# Patient Record
Sex: MALE
[De-identification: names, ages, dates, MRNs, and addresses within clinical notes are randomized per-mention and may not be internally consistent; named-entity substitution may affect disease eponyms.]

---

## 2018-04-20 ENCOUNTER — HOSPITAL ENCOUNTER (EMERGENCY)
Dept: HOSPITAL 42 - ED | Age: 53
Discharge: HOME | End: 2018-04-20
Payer: MEDICARE

## 2018-04-20 VITALS — DIASTOLIC BLOOD PRESSURE: 70 MMHG | SYSTOLIC BLOOD PRESSURE: 169 MMHG | HEART RATE: 72 BPM

## 2018-04-20 VITALS — TEMPERATURE: 97.6 F | OXYGEN SATURATION: 98 %

## 2018-04-20 VITALS — RESPIRATION RATE: 18 BRPM

## 2018-04-20 VITALS — BODY MASS INDEX: 25.7 KG/M2

## 2018-04-20 DIAGNOSIS — E11.9: ICD-10-CM

## 2018-04-20 DIAGNOSIS — I10: ICD-10-CM

## 2018-04-20 DIAGNOSIS — Z23: ICD-10-CM

## 2018-04-20 DIAGNOSIS — I25.10: ICD-10-CM

## 2018-04-20 DIAGNOSIS — Z76.0: Primary | ICD-10-CM

## 2018-04-20 DIAGNOSIS — Z87.891: ICD-10-CM

## 2018-04-20 DIAGNOSIS — L03.319: ICD-10-CM

## 2018-04-20 LAB
APTT BLD: 26.8 SECONDS (ref 25.1–36.5)
BASE EXCESS BLDV CALC-SCNC: 0.8 MMOL/L (ref 0–2)
BASOPHILS # BLD AUTO: 0.02 K/MM3 (ref 0–2)
BASOPHILS NFR BLD: 0.2 % (ref 0–3)
BUN SERPL-MCNC: 22 MG/DL (ref 7–21)
CALCIUM SERPL-MCNC: 9.7 MG/DL (ref 8.4–10.5)
EOSINOPHIL # BLD: 0.2 10*3/UL (ref 0–0.7)
EOSINOPHIL NFR BLD: 2 % (ref 1.5–5)
ERYTHROCYTE [DISTWIDTH] IN BLOOD BY AUTOMATED COUNT: 13.5 % (ref 11.5–14.5)
GFR NON-AFRICAN AMERICAN: > 60
GRANULOCYTES # BLD: 6.86 10*3/UL (ref 1.4–6.5)
GRANULOCYTES NFR BLD: 71.8 % (ref 50–68)
HGB BLD-MCNC: 14.8 G/DL (ref 14–18)
INR PPP: 0.99 (ref 0.93–1.08)
LYMPHOCYTES # BLD: 1.9 10*3/UL (ref 1.2–3.4)
LYMPHOCYTES NFR BLD AUTO: 20 % (ref 22–35)
MCH RBC QN AUTO: 27.9 PG (ref 25–35)
MCHC RBC AUTO-ENTMCNC: 35.2 G/DL (ref 31–37)
MCV RBC AUTO: 79.4 FL (ref 80–105)
MONOCYTES # BLD AUTO: 0.6 10*3/UL (ref 0.1–0.6)
MONOCYTES NFR BLD: 6 % (ref 1–6)
PH BLDV: 7.31 [PH] (ref 7.32–7.43)
PLATELET # BLD: 140 10^3/UL (ref 120–450)
PMV BLD AUTO: 9.5 FL (ref 7–11)
PROTHROMBIN TIME: 11.4 SECONDS (ref 9.4–12.5)
RBC # BLD AUTO: 5.3 10^6/UL (ref 3.5–6.1)
VENOUS BLOOD FIO2: 21 %
VENOUS BLOOD GAS PCO2: 56 (ref 40–60)
VENOUS BLOOD GAS PO2: 31 MM/HG (ref 30–55)
WBC # BLD AUTO: 9.6 10^3/UL (ref 4.5–11)

## 2018-04-20 PROCEDURE — 90715 TDAP VACCINE 7 YRS/> IM: CPT

## 2018-04-20 PROCEDURE — 85610 PROTHROMBIN TIME: CPT

## 2018-04-20 PROCEDURE — 80048 BASIC METABOLIC PNL TOTAL CA: CPT

## 2018-04-20 PROCEDURE — 82948 REAGENT STRIP/BLOOD GLUCOSE: CPT

## 2018-04-20 PROCEDURE — 82803 BLOOD GASES ANY COMBINATION: CPT

## 2018-04-20 PROCEDURE — 90471 IMMUNIZATION ADMIN: CPT

## 2018-04-20 PROCEDURE — 96365 THER/PROPH/DIAG IV INF INIT: CPT

## 2018-04-20 PROCEDURE — 99283 EMERGENCY DEPT VISIT LOW MDM: CPT

## 2018-04-20 PROCEDURE — 96375 TX/PRO/DX INJ NEW DRUG ADDON: CPT

## 2018-04-20 PROCEDURE — 85730 THROMBOPLASTIN TIME PARTIAL: CPT

## 2018-04-20 PROCEDURE — 87181 SC STD AGAR DILUTION PER AGT: CPT

## 2018-04-20 PROCEDURE — 87070 CULTURE OTHR SPECIMN AEROBIC: CPT

## 2018-04-20 PROCEDURE — 85025 COMPLETE CBC W/AUTO DIFF WBC: CPT

## 2018-04-20 NOTE — ED PDOC
Arrival/HPI





- General


Historian: Patient





- History of Present Illness


Time/Duration: < week


Symptom Onset: Gradual


Symptom Course: Worsening





<Romulo Blackmemoemily - Last Filed: 04/20/18 13:02>





- History of Present Illness


Quality: Tightness, Stabbing, Burning


Severity Level: 8, Severe


Activities at Onset: Rest


Context: Home





<Dante Triplett - Last Filed: 04/20/18 20:08>





- General


Chief Complaint: Abnormal Skin Integrity


Time Seen by Provider: 04/20/18 10:00





- History of Present Illness


Narrative History of Present Illness (Text): 





04/20/18 08:36


53 yo M with PMH of cardiomyopathy, mitral valve prolapse, CAD s/p stent, 

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 

HTN, DM, and GERD presents to ER complaining of pelvic skin abscess. Three days 

ago, patient had a "pimple" in his pelvic region, overlying the pubic bone, 

which he popped and cleaned. The following day, the "pimple" returned, and he 

again popped and cleaned it, expressed pus and blood. Over the past two days, 

he has had increasing pain and swelling, without drainage or bleeding. He 

denies any other ulceration, skin lesions, penile ulcers, urethral discharge, 

rash, recent illness, fever, chills, nausea, vomiting, diarrhea, constipation, 

abdominal pain, groin pain, leg pain, back pain, chest pain, shortness of breath

, dysuria, hematuria. Patient is sexually active with his wife of many years, 

to whom he is faithful to and does not suspect infidelity. Patient is in 

[facility] from [place] for the birth of his [facility], and he plans 

on staying for several months.


 (Gena Black)





Past Medical History





- Provider Review


Nursing Documentation Reviewed: Yes





- Travel History


Have you recently traveled outside [place] w/in the past 3 mons?: No





- Infectious Disease


Hx of Infectious Diseases: None





- Tetanus Immunization


Tetanus Immunization: >10 years Ago





- Cardiac


Hx Cardiac Disorders: Yes


Hx Angina: Yes


Hx Cardiac Arrhythmia: Yes


Hx Mitral Valve Prolapse: Yes


Other/Comment: 4 MI, cardiomyopathy





- Pulmonary


Hx Respiratory Disorders: Yes


Hx Asthma: Yes





- Neurological


Hx Neurological Disorder: Yes


Hx Seizures: Yes


Other/Comment: Brain tumor





- HEENT


Hx HEENT Disorder: No





- Renal


Hx Renal Disorder: No





- Endocrine/Metabolic


Hx Endocrine Disorders: Yes


Hx Diabetes Mellitus Type 2: Yes


Other/Comment: BRAIN TUMOR (NON MALIGNANT)





- Hematological/Oncological


Hx Blood Disorders: No





- Integumentary


Hx Dermatological Disorder: No





- Musculoskeletal/Rheumatological


Hx Musculoskeletal Disorders: No


Hx Falls: No





- Gastrointestinal


Hx Gastrointestinal Disorders: No





- Genitourinary/Gynecological


Hx Genitourinary Disorders: No





- Psychiatric


Hx Psychophysiologic Disorder: No


Hx Substance Use: No





- Surgical History


Hx Appendectomy: Yes


Hx Orthopedic Surgery: Yes (LEFT SHOULDER REPAIR)





- Anesthesia


Hx Anesthesia: No





- Suicidal Assessment


Feels Threatened In Home Enviroment: No





<Gena Black - Last Filed: 04/20/18 13:02>





- Provider Review


Nursing Documentation Reviewed: Yes





- Past History


Past History: No Previous





<Dante Triplett - Last Filed: 04/20/18 20:08>





- Patient History


Narrative Patient History: 





cardiomyopathy, mitral valve prolapse, CAD s/p stent, carotid stenosis s/p stent

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





Family/Social History





- Physician Review


Nursing Documentation Reviewed: Yes


Family/Social History: Unknown Family HX


Smoking Status: Former Smoker


Hx Alcohol Use: No


Hx Substance Use: No





<Gena Black - Last Filed: 04/20/18 13:02>





- Physician Review


Nursing Documentation Reviewed: Yes


Hx Substance Use Treatment: No





<Dante Triplett - Last Filed: 04/20/18 20:08>





Allergies/Home Meds





<Gena Black - Last Filed: 04/20/18 13:02>





<Dante Triplett - Last Filed: 04/20/18 20:08>


Allergies/Adverse Reactions: 


Allergies





alprazolam [From Xanax] Allergy (Verified 04/20/18 08:26)


 RASH


amoxicillin Allergy (Verified 04/20/18 08:26)


 RASH


aspirin Allergy (Verified 04/20/18 08:26)


 RASH


propranolol [From Inderal LA] Allergy (Verified 04/20/18 08:26)


 RASH


shellfish derived Allergy (Verified 04/20/18 08:26)


 RASH








Home Medications: 


 Home Meds











 Medication  Instructions  Recorded  Confirmed


 


Metoprolol Succinate [Toprol XL] 25 mg PO DAILY 08/08/15 04/20/18


 


Diclofenac Sodium [Voltaren] 75 mg PO DAILY 04/20/18 04/20/18


 


Lisinopril [Zestril] 2.5 mg PO DAILY 04/20/18 04/20/18


 


Omeprazole 40 mg PO DAILY 04/20/18 04/20/18














Review of Systems





- Review of Systems


Constitutional: Normal


Eyes: Normal


ENT: Normal


Respiratory: Normal


Cardiovascular: Normal


Gastrointestinal: Normal


Genitourinary Male: Normal


Musculoskeletal: Normal


Skin: Skin Lesions (pelvic, as in HPI)


Neurological: Normal


Endocrine: Normal


Hemo/Lymphatic: Normal


Psychiatric: Normal





<Amine,Mukarram - Last Filed: 04/20/18 13:02>





Physical Exam


Vital Signs Reviewed: Yes


Temperature: Afebrile


Blood Pressure: Hypertensive


Pulse: Regular


Respiratory Rate: Normal


Appearance: Positive for: Well-Appearing, Non-Toxic, Comfortable


Pain Distress: None


Mental Status: Positive for: Alert and Oriented X 3





- Systems Exam


Head: Present: Atraumatic, Normocephalic


Pupils: Present: PERRL


Extroacular Muscles: Present: EOMI


Conjunctiva: Present: Normal


Mouth: Present: Moist Mucous Membranes


Neck: Present: Normal Range of Motion


Respiratory/Chest: Present: Clear to Auscultation, Good Air Exchange.  No: 

Respiratory Distress, Accessory Muscle Use


Cardiovascular: Present: Regular Rate and Rhythm, Normal S1, S2


Abdomen: Present: Normal Bowel Sounds.  No: Tenderness, Distention, Peritoneal 

Signs


Genitourinary Male: Present: Normal External Genitalia, Circumcised Penis, 

Lesions (5x6cm erythema with 4cm induration with 1cm dry eschar in midline, 

over pubis; tender; no fluctuance; can express scant pruluent/bloody drainage), 

Other (No inguinal lymphadenopathy).  No: Penile Discharge, Testicle Tenderness

, Penile Swelling, Masses, Testicle Swelling


Upper Extremity: Present: Normal Inspection.  No: Cyanosis, Edema


Lower Extremity: Present: Normal Inspection.  No: Edema, CALF TENDERNESS


Neurological: Present: GCS=15, CN II-XII Intact, Speech Normal


Skin: Present: Warm, Dry, Normal Color


Lymphatic: No: Inguinal Adenopathy


Psychiatric: Present: Alert, Oriented x 3, Normal Insight, Normal Concentration





<Amine,Mukarram - Last Filed: 04/20/18 13:02>


Vital Signs











  Temp Pulse Resp BP Pulse Ox


 


 04/20/18 11:37   72   169/70 H 


 


 04/20/18 11:30   78  18  169/70 H  98


 


 04/20/18 08:29  97.6 F  91 H  17  167/97 H  98














Medical Decision Making





<Gena Black - Last Filed: 04/20/18 13:02>


Re-evaluation Time: 12:00


Reassessment Condition: Improved





- Lab Interpretations


I have reviewed the lab results: Yes


Interpretation: Abnormal lab values (elevated gluc)





<Dante Triplett - Last Filed: 04/20/18 20:08>


ED Course and Treatment: 





04/20/18 10:21


Impression


Skin infection





Differential diagnosis includes, but is not limited to


Abscess, cellulitis





Plan


-- Labs


-- IV clindamycin x1


-- Analgesia


-- Tdap booster


-- Wound culture


-- Reassess and dispo





Progress note


04/20/18 12:32


-- Patient comfortable. Gave one time dose of home medications. BS elevated on 

presentation, slightly improved after home dose of glipizide, likely reactive 2/

2 infection


-- Erythema minimally improved after IV dose of clindamycin


-- Patient remains afebrile, VSS


-- Patient will be discharged with 10 days course of PO clindamycin. Instructed 

patient to return to ER for any new or worsening concerns, especially worsening 

erythema, fever/chills, increasing pain. Instructed patient to follow up with 

his PMD (Dr. Moscoso) within 3-5 days. Provided 7 day prescription for patient's 

home medications, Depakote and Glipizide, because he forgot them in [place]. 

Patient to follow up with PMD for refill. Patient verbalized understanding and 

agreement with plan. (Gena Black)





In agreement with resident note, which includes further HPI details. Patient 

was seen and evaluated with resident, came up with plan and treatment together. 


I performed the hx and physical exam of the patient and discussed their mgt 

with the RESIDENT. I reviewed the RESIDENT's NOTE and agree with the assessment 

and plan of care.





skin: noted suprapubic mound region skin erythema with central induration/non-

fluctuant, slightly tender, no blister/vesicles/lesions/ulcerations/bullae/

NIKOSKY's sign; no bloody discharge, no expressible pus discharge; does not 

extend to the penis/base of the penis; no extension to b/l groin, no extension 

posterior to gluteal folds





pt is doing well


pt is comfortable, NON-toxic appearing with stable vital signs and unremarkable 

labs





pt is made aware of his medical results


pt is recommend clean/wound care 


pt is encouraged not to pop/squeeze anything in the future


pt is directed to see his PCP next week


pt is encouraged to return to ED if worsening skin discoloration/pain/high fever

/worsening wound


pt expressed understanding


pt will be discharged home





 (Dante Triplett)





- Lab Interpretations


Lab Results: 








 04/20/18 09:00 





 04/20/18 09:00 





 Lab Results





04/20/18 12:03: POC Glucose (mg/dL) 286 H


04/20/18 10:00: PT 11.4, INR 0.99, APTT 26.8


04/20/18 09:00: Sodium 143, Chloride 101, Potassium 4.4, Carbon Dioxide 27, 

Anion Gap 19, BUN 22 H, Creatinine 1.1, Est GFR (African Amer) > 60, Est GFR (

Non-Af Amer) > 60, Random Glucose 309 H*, Calcium 9.7


04/20/18 09:00: pO2 31, VBG pH 7.31 L, VBG pCO2 56.0, VBG HCO3 28.2 H, VBG 

Total CO2 29.9 H, VBG O2 Sat (Calc) 68.2 H, VBG Base Excess 0.8, VBG Potassium 

4.2, Sodium 137.0, Chloride 102.0, Glucose 318 H, Lactate 2.0, FiO2 21.0, 

Venous Blood Potassium 4.2


04/20/18 09:00: WBC 9.6  D, RBC 5.30, Hgb 14.8, Hct 42.1, MCV 79.4 L, MCH 27.9, 

MCHC 35.2, RDW 13.5, Plt Count 140, MPV 9.5, Gran % 71.8 H, Lymph % (Auto) 20.0 

L, Mono % (Auto) 6.0, Eos % (Auto) 2.0, Baso % (Auto) 0.2, Gran # 6.86 H, Lymph 

# (Auto) 1.9, Mono # (Auto) 0.6, Eos # (Auto) 0.2, Baso # (Auto) 0.02











- Medication Orders


Current Medication Orders: 











Discontinued Medications





Divalproex Sodium (Depakote Dr (*Bid*))  500 mg PO STAT STA


   PRN Reason: Protocol


   Stop: 04/20/18 10:32


   Last Admin: 04/20/18 11:38  Dose: 500 mg





Fentanyl (Fentanyl)  50 mcg IVP ONCE ONE


   Stop: 04/20/18 08:57


   Last Admin: 04/20/18 09:17  Dose: 50 mcg





MAR Pain Assessment


 Document     04/20/18 09:17  [facility]  (Rec: 04/20/18 09:17  [id number]


     Pain Reassessment


      Is this a pain reassessment?               No


     Sleep


      Is patient sleeping during reassessment?   No


     Presence of Pain


      Presence of Pain                           Yes


     Pain Scale Used


      Pain Scale Used                            Numeric


     Location


      Upper or Lower                             Lower


      Pain Location Body Site                    Abdomen


     Description


      Description                                Constant


      Intensity of Pain at present               8


IVP Administration


 Document     04/20/18 09:17  [facility]  (Rec: 04/20/18 09:17  [id number]


     Charges for Administration


      # of IVP Administrations                   1





Glipizide (Glucotrol)  5 mg PO ACB LUANNE


   Last Admin: 04/20/18 11:38  Dose: 5 mg





Clindamycin Phosphate 600 mg/ (Sodium Chloride)  54 mls @ 108 mls/hr IVPB STAT 

STA


   PRN Reason: Protocol


   Stop: 04/20/18 09:55


   Last Admin: 04/20/18 09:56  Dose: 108 mls/hr





eMAR Start Stop


 Document     04/20/18 09:56  [facility]  (Rec: 04/20/18 09:56  [id number]


     Intravenous Solution


      Start Date                                 04/20/18


      Start Time                                 09:56


      End Date                                   04/20/18


      End time                                   10:26


      Total Infusion Time                        30





Lisinopril (Zestril)  2.5 mg PO STAT STA


   Stop: 04/20/18 10:32


   Last Admin: 04/20/18 11:38  Dose: 2.5 mg





Metoprolol Succinate (Toprol Xl)  25 mg PO STAT STA


   Stop: 04/20/18 10:32


   Last Admin: 04/20/18 11:37  Dose: 25 mg





MAR Pulse and Blood Pressure


 Document     04/20/18 11:37  [facility]  (Rec: 04/20/18 11:38  [id number]


     Pulse


      Pulse Rate (60-90)                         72


     Blood Pressure


      Blood Pressure (100//90)             169/70





Pantoprazole Sodium (Protonix Ec Tab)  40 mg PO STAT STA


   Stop: 04/20/18 10:32


   Last Admin: 04/20/18 11:37  Dose: 40 mg





Tetanus/Reduced Diphtheria/Acell Pertussis (Boostrix Vaccine Inj)  0.5 ml IM 

.ONCE ONE


   Stop: 04/20/18 08:57


   Last Admin: 04/20/18 09:16  Dose: 0.5 ml





Immunization Registry


 Document     04/20/18 09:16  [facility]  (Rec: 04/20/18 09:17  [facility]  [id number])


      Immunization Registry Consent Date         04/20/18














<Gena Black - Last Filed: 04/20/18 13:02>





- PA / NP / Resident Statement


MD/ has reviewed & agrees with the documentation as recorded.


MD/ has examined the patient and agrees with the treatment plan.





- Scribe Statement


The provider has reviewed the documentation as recorded by the Scribe





<Dante Triplett - Last Filed: 04/20/18 20:08>





- Scribe Statement





Bel Jensen





Provider Scribe Attestation:


All medical record entries made by the Scribe were at my direction and 

personally dictated by me. I have reviewed the chart and agree that the record 

accurately reflects my personal performance of the history, physical exam, 

medical decision making, and the department course for this patient. I have 

also personally directed, reviewed, and agree with the discharge instructions 

and disposition.  (Dante Triplett)





Disposition/Present on Arrival





- Present on Arrival


Any Indicators Present on Arrival: No


History of DVT/PE: No


History of Uncontrolled Diabetes: No


Urinary Catheter: No


History of Decub. Ulcer: No


History Surgical Site Infection Following: None





- Disposition


Have Diagnosis and Disposition been Completed?: Yes


Disposition Time: 12:36


Patient Plan: Discharge





<Gena Black - Last Filed: 04/20/18 13:02>





<Dante Triplett - Last Filed: 04/20/18 20:08>





- Disposition


Diagnosis: 


 Cellulitis, Medication refill





Disposition: HOME/ ROUTINE


Condition: FAIR


Discharge Instructions (ExitCare):  Cellulitis (Skin Infection), Adult (DC)


Additional Instructions: 


-- Continue to take the clindamycin four times daily for the next 10 days


-- Continue to take all other medications as previously prescribed


-- Follow up with Dr. Moscoso within the next 3-5 days


-- Use motrin or aleve for pain


-- Watch for worsening redness (more than 50% of current size is concerning), 

worsening pain, fever/chills, nausea/vomiting


-- Return to the ER for any new or worsening concerns, especially the above


-- Do not try to "pop" your infection, or any new lesions. Keep it clean and 

dry.


Prescriptions: 


Clindamycin [Cleocin] 150 mg PO Q6H #40 cap


Divalproex [Depakote ER] 500 mg PO DAILY #7 ter


GlipiZIDE [Glucotrol] 5 mg PO DAILY #7 tab


Referrals: 


Julio Reyes, [Primary Care Provider] - Follow up with primary


Forms:  [employer] (English)

## 2018-04-22 NOTE — ED PDOC
ED Additional Note





- Physician Additional Note


Physician Additional Note: 





spoke with patient regarding wound culture. MRSA sensitive to clindamycin. pt 

currently on clindamycin. i spoke with the patient he is feeling much better. 

states infection has improved. he has f/u with pmd tomorrow. advised return if 

symptoms worsen,persist or if new symptoms develop.

## 2018-07-13 ENCOUNTER — HOSPITAL ENCOUNTER (OUTPATIENT)
Dept: HOSPITAL 42 - ED | Age: 53
Setting detail: OBSERVATION
LOS: 1 days | Discharge: HOME | End: 2018-07-14
Attending: INTERNAL MEDICINE | Admitting: INTERNAL MEDICINE
Payer: MEDICARE

## 2018-07-13 VITALS — BODY MASS INDEX: 26.4 KG/M2

## 2018-07-13 DIAGNOSIS — Z87.891: ICD-10-CM

## 2018-07-13 DIAGNOSIS — J45.909: ICD-10-CM

## 2018-07-13 DIAGNOSIS — Z95.5: ICD-10-CM

## 2018-07-13 DIAGNOSIS — I10: ICD-10-CM

## 2018-07-13 DIAGNOSIS — I42.9: ICD-10-CM

## 2018-07-13 DIAGNOSIS — M54.12: ICD-10-CM

## 2018-07-13 DIAGNOSIS — I25.2: ICD-10-CM

## 2018-07-13 DIAGNOSIS — D32.9: ICD-10-CM

## 2018-07-13 DIAGNOSIS — I34.1: ICD-10-CM

## 2018-07-13 DIAGNOSIS — I25.10: ICD-10-CM

## 2018-07-13 DIAGNOSIS — R07.89: Primary | ICD-10-CM

## 2018-07-13 DIAGNOSIS — E78.5: ICD-10-CM

## 2018-07-13 DIAGNOSIS — Z86.73: ICD-10-CM

## 2018-07-13 DIAGNOSIS — E11.9: ICD-10-CM

## 2018-07-13 DIAGNOSIS — I48.0: ICD-10-CM

## 2018-07-13 LAB
ALBUMIN SERPL-MCNC: 4.5 G/DL (ref 3–4.8)
ALBUMIN/GLOB SERPL: 1.5 {RATIO} (ref 1.1–1.8)
ALT SERPL-CCNC: 78 U/L (ref 7–56)
AST SERPL-CCNC: 49 U/L (ref 17–59)
BASOPHILS # BLD AUTO: 0.03 K/MM3 (ref 0–2)
BASOPHILS NFR BLD: 0.6 % (ref 0–3)
BUN SERPL-MCNC: 18 MG/DL (ref 7–21)
CALCIUM SERPL-MCNC: 9.5 MG/DL (ref 8.4–10.5)
EOSINOPHIL # BLD: 0.1 10*3/UL (ref 0–0.7)
EOSINOPHIL NFR BLD: 2.2 % (ref 1.5–5)
ERYTHROCYTE [DISTWIDTH] IN BLOOD BY AUTOMATED COUNT: 13.7 % (ref 11.5–14.5)
GFR NON-AFRICAN AMERICAN: > 60
GRANULOCYTES # BLD: 3.38 10*3/UL (ref 1.4–6.5)
GRANULOCYTES NFR BLD: 62.4 % (ref 50–68)
HGB BLD-MCNC: 14.3 G/DL (ref 14–18)
LYMPHOCYTES # BLD: 1.7 10*3/UL (ref 1.2–3.4)
LYMPHOCYTES NFR BLD AUTO: 30.6 % (ref 22–35)
MCH RBC QN AUTO: 28.1 PG (ref 25–35)
MCHC RBC AUTO-ENTMCNC: 35.9 G/DL (ref 31–37)
MCV RBC AUTO: 78.2 FL (ref 80–105)
MONOCYTES # BLD AUTO: 0.2 10*3/UL (ref 0.1–0.6)
MONOCYTES NFR BLD: 4.2 % (ref 1–6)
PLATELET # BLD: 135 10^3/UL (ref 120–450)
PMV BLD AUTO: 9.6 FL (ref 7–11)
RBC # BLD AUTO: 5.09 10^6/UL (ref 3.5–6.1)
TROPONIN I SERPL-MCNC: < 0.01 NG/ML
WBC # BLD AUTO: 5.4 10^3/UL (ref 4.5–11)

## 2018-07-13 PROCEDURE — 82948 REAGENT STRIP/BLOOD GLUCOSE: CPT

## 2018-07-13 PROCEDURE — 82550 ASSAY OF CK (CPK): CPT

## 2018-07-13 PROCEDURE — 83615 LACTATE (LD) (LDH) ENZYME: CPT

## 2018-07-13 PROCEDURE — 36415 COLL VENOUS BLD VENIPUNCTURE: CPT

## 2018-07-13 PROCEDURE — 97162 PT EVAL MOD COMPLEX 30 MIN: CPT

## 2018-07-13 PROCEDURE — 93306 TTE W/DOPPLER COMPLETE: CPT

## 2018-07-13 PROCEDURE — 84443 ASSAY THYROID STIM HORMONE: CPT

## 2018-07-13 PROCEDURE — 97530 THERAPEUTIC ACTIVITIES: CPT

## 2018-07-13 PROCEDURE — 99285 EMERGENCY DEPT VISIT HI MDM: CPT

## 2018-07-13 PROCEDURE — 84484 ASSAY OF TROPONIN QUANT: CPT

## 2018-07-13 PROCEDURE — 80053 COMPREHEN METABOLIC PANEL: CPT

## 2018-07-13 PROCEDURE — 96374 THER/PROPH/DIAG INJ IV PUSH: CPT

## 2018-07-13 PROCEDURE — 93005 ELECTROCARDIOGRAM TRACING: CPT

## 2018-07-13 PROCEDURE — 80061 LIPID PANEL: CPT

## 2018-07-13 PROCEDURE — 71045 X-RAY EXAM CHEST 1 VIEW: CPT

## 2018-07-13 PROCEDURE — 85025 COMPLETE CBC W/AUTO DIFF WBC: CPT

## 2018-07-13 PROCEDURE — 83036 HEMOGLOBIN GLYCOSYLATED A1C: CPT

## 2018-07-13 PROCEDURE — 83735 ASSAY OF MAGNESIUM: CPT

## 2018-07-13 PROCEDURE — 72125 CT NECK SPINE W/O DYE: CPT

## 2018-07-13 RX ADMIN — INSULIN HUMAN SCH: 100 INJECTION, SOLUTION PARENTERAL at 21:17

## 2018-07-13 RX ADMIN — INSULIN HUMAN SCH: 100 INJECTION, SOLUTION PARENTERAL at 17:18

## 2018-07-13 NOTE — CARD
--------------- APPROVED REPORT --------------





Date of service: 07/13/2018



EKG Measurement

Heart [id number]

NC [id number]

[id number]

[id number]

[id number]



<Conclusion>

Normal sinus rhythm

Normal ECG

## 2018-07-13 NOTE — CP.PCM.HP
<LoreeIrving - Last Filed: 07/13/18 17:33>





History of Present Illness





- History of Present Illness


History of Present Illness: 


CC: Chest Pain, Neck Pain


HPI:


Mr. Downing is a 52 year old male with PMH of 5-7 heart attacks s/p 1 stent, HTN, 

DLD not on statin, DM-2, CVA at age 22 no longer on plavix, paroxysmal atrial 

fibrillation, and meningioma s/p 5 brain surgeries who presents to the ED with 

chest pain and neck pain. The chest pain started this morning while he was 

driving and he describes the pain to be sharp, 10/10, does not radiate, and he 

states that this chest pain feels different from a heart attack. The chest pain 

is located mid-sternal and is reproducible. He was given nitroglycerin in the 

ED and the pain went down to a 5/10. Patient also complains of neck pain that 

started 2 months ago. He describes the pain to be sharp and radiates to his 

entire left arm. The pain is made significantly worse when he sidebends his 

head to the left and on head extension. He also admits to having tingling and 

numbness to both his hands especially on his left thumb. Patient denies 

shortness of breath, fever, chills, abdominal pain, nausea, vomiting, diarrhea, 

and urinary symptoms








12 point ROS negative except as indicated in HPI





Past medical history: 5-7 MI's, CVA, HTN, DM-2, HLD, paroxysmal atrial 

fibrillation, and meningioma.


Surgical History: 5 brain surgeries, 1 x cardiac stent, carotid artery stent, 

rotator cuff muscle and tendon repair.


Allergies: Aspirin-rash, amoxicillin, xanax, propanolol, shellfish.


Social History: Tobacco- 31 pack years, last smoked 5 years ago. Denies alcohol

, used to be a social drinker.  Denies illicit drug use.  


Family History: Father- high cholesterol


PMD: Dr. Moscoso


Pharmacy: [facility]: Metoprolol, omeprazole, glipizide, lisinopril, diclofenac, divalproex, 

gabapentin.








Present on Admission





- Present on Admission


Any Indicators Present on Admission: Yes


History of DVT/PE: No


History of Uncontrolled Diabetes: Yes


Urinary Catheter: No


Decubitus Ulcer Present: No





Review of Systems





- Review of Systems


Review of Systems: 


12 point ROS negative except as indicated in HPI





Past Patient History





- Infectious Disease


Hx of Infectious Diseases: None





- Tetanus Immunizations


Tetanus Immunization: >10 years Ago





- Past Medical History & Family History


Past Medical History?: Yes





- Past Social History


Smoking Status: Former Smoker





- CARDIAC


Hx Cardiac Disorders: Yes


Hx Angina: Yes


Hx Cardia Arrhythmia: Yes


Hx Mitral Valve Prolapse: Yes


Other/Comment: 4 MI, cardiomyopathy





- PULMONARY


Hx Respiratory Disorders: Yes


Hx Asthma: Yes





- NEUROLOGICAL


Hx Neurological Disorder: Yes


Hx Seizures: Yes


Other/Comment: Brain tumor





- HEENT


Hx HEENT Problems: No





- RENAL


Hx Chronic Kidney Disease: No





- ENDOCRINE/METABOLIC


Hx Endocrine Disorders: Yes


Hx Diabetes Mellitus Type 2: Yes


Other/Comment: BRAIN TUMOR (NON MALIGNANT)





- HEMATOLOGICAL/ONCOLOGICAL


Hx Blood Disorders: No





- INTEGUMENTARY


Hx Dermatological Problems: No





- MUSCULOSKELETAL/RHEUMATOLOGICAL


Hx Musculoskeletal Disorders: No


Hx Falls: No





- GASTROINTESTINAL


Hx Gastrointestinal Disorders: No





- GENITOURINARY/GYNECOLOGICAL


Hx Genitourinary Disorders: No





- PSYCHIATRIC


Hx Psychophysiologic Disorder: No


Hx Substance Use: No





- SURGICAL HISTORY


Hx Appendectomy: Yes


Hx Orthopedic Surgery: Yes (LEFT SHOULDER REPAIR)





- ANESTHESIA


Hx Anesthesia: No





Meds


Allergies/Adverse Reactions: 


 Allergies











Allergy/AdvReac Type Severity Reaction Status Date / Time


 


alprazolam [From Xanax] Allergy  RASH Verified 04/20/18 08:26


 


amoxicillin Allergy  RASH Verified 04/20/18 08:26


 


aspirin Allergy  RASH Verified 04/20/18 08:26


 


propranolol [From Inderal LA] Allergy  RASH Verified 04/20/18 08:26


 


shellfish derived Allergy  RASH Verified 04/20/18 08:26














Physical Exam





- Constitutional


Appears: No Acute Distress





- Head Exam


Head Exam: ATRAUMATIC, NORMAL INSPECTION, NORMOCEPHALIC





- Eye Exam


Eye Exam: Normal appearance, PERRL.  absent: Nystagmus, Scleral icterus


Pupil Exam: NORMAL ACCOMODATION





- ENT Exam


ENT Exam: Mucous Membranes Moist





- Neck Exam


Neck exam: Positive for: Tenderness.  Negative for: Full Rom


Additional comments: 


Cervical spine tender on palpation on the back and left side.





- Respiratory Exam


Respiratory Exam: Clear to Auscultation Bilateral.  absent: Rales, Rhonchi, 

Wheezes





- Cardiovascular Exam


Cardiovascular Exam: Irregular Rhythm, +S1, +S2.  absent: Gallop, Rubs, 

Systolic Murmur





- GI/Abdominal Exam


GI & Abdominal Exam: Normal Bowel Sounds, Soft.  absent: Tenderness





- Extremities Exam


Extremities exam: Negative for: full ROM, pedal edema, tenderness


Additional comments: 


Sensations intact on bilateral arms except for loss of sensation of left thumb.





- Back Exam


Back exam: paraspinal tenderness, vertebral tenderness


Additional comments: 


Cervical spine tender on palpation and hypertonic cervical paraspinal muscles.





- Neurological Exam


Neurological exam: Alert, CN II-XII Intact, Oriented x3, Reflexes Normal


Additional comments: 


Muscle strength left extremity 3/5


Muscle strength right extremity 5/5.





- Psychiatric Exam


Psychiatric exam: Normal Affect, Normal Mood





- Skin


Skin Exam: Dry, Normal Color, Warm





Results





- Vital Signs


Recent Vital Signs: 





 Last Vital Signs











Temp  98.6 F   07/13/18 13:33


 


Pulse  62   07/13/18 13:33


 


Resp  16   07/13/18 13:33


 


BP  152/90 H  07/13/18 13:33


 


Pulse Ox  99   07/13/18 13:33














- Labs


Result Diagrams: 


 07/13/18 13:47





 07/13/18 13:47





Assessment & Plan





- Assessment and Plan (Free Text)


Assessment: 


Mr. Downing is a 52 year old male with PMH of 5-7 MI's, HTN, DLD not on statin, DM-

2, CVA at age 22 no longer on plavix, paroxysmal atrial fibrillation, and 

meningioma s/p 5 brain surgeries who presents to the ED with chest pain and 

neck pain


Plan: 


Chest pain


- Most likely muskuloskeletal but need to r/o ACS due to patient's cardiac 

history of MI's and s/p stent


- Not on antiplatelets- allergic to ASA


- Troponin 0.01 x 1, will continue to trend Q6H


- Cardiology consulted


- EKG: NSR @ 85, no ST elevations or depressions.


- CXR: No acute findings


- Echo pending


- C/w Metoprolol


- Heart healthy diet





Cervical radiculopathy


- CT scan of neck pending


- PT consulted


- Start Flexeril





History of diabetes


- HbA1c ordered


- ISS


- Fingerstick blood glucose- ACHS





History of hyperlipidemia


- Not on lipid-lowering medications


- Lipid panel ordered





History of hypertension


- C/w lisinopril


- Continue to monitor 





History of brain tumor


- C/w Divalproex





GI/DVT prophylaxis


- Lovenox


- Protonix











Patient seen, examined, and case discussed with Dr. Mckeon





<Olive Mckeon - Last Filed: 07/14/18 19:02>





Results





- Vital Signs


Recent Vital Signs: 





 Last Vital Signs











Temp  98 F   07/14/18 11:47


 


Pulse  90   07/14/18 18:00


 


Resp  20   07/14/18 11:47


 


BP  138/82   07/14/18 11:47


 


Pulse Ox  99   07/14/18 09:00














- Labs


Result Diagrams: 


 07/14/18 06:20





 07/14/18 06:20


Labs: 





 Laboratory Results - last 24 hr











  07/13/18 07/13/18 07/14/18





  19:45 20:53 01:15


 


WBC   


 


RBC   


 


Hgb   


 


Hct   


 


MCV   


 


MCH   


 


MCHC   


 


RDW   


 


Plt Count   


 


MPV   


 


Gran %   


 


Lymph % (Auto)   


 


Mono % (Auto)   


 


Eos % (Auto)   


 


Baso % (Auto)   


 


Gran #   


 


Lymph # (Auto)   


 


Mono # (Auto)   


 


Eos # (Auto)   


 


Baso # (Auto)   


 


Sodium   


 


Potassium   


 


Chloride   


 


Carbon Dioxide   


 


Anion Gap   


 


BUN   


 


Creatinine   


 


Est GFR ( Amer)   


 


Est GFR (Non-Af Amer)   


 


POC Glucose (mg/dL)   248 H 


 


Random Glucose   


 


Calcium   


 


Total Bilirubin   


 


AST   


 


ALT   


 


Alkaline Phosphatase   


 


Troponin I  < 0.01   < 0.01


 


Total Protein   


 


Albumin   


 


Globulin   


 


Albumin/Globulin Ratio   


 


Triglycerides   


 


Cholesterol   


 


LDL Cholesterol Direct   


 


HDL Cholesterol   














  07/14/18 07/14/18 07/14/18





  06:20 06:20 07:47


 


WBC  5.3  


 


RBC  4.83  


 


Hgb  13.3 L  


 


Hct  38.2 L  


 


MCV  79.1 L  


 


MCH  27.5  


 


MCHC  34.8  


 


RDW  13.6  


 


Plt Count  128  


 


MPV  9.8  


 


Gran %  46.0 L  


 


Lymph % (Auto)  44.7 H  


 


Mono % (Auto)  5.1  


 


Eos % (Auto)  3.8  


 


Baso % (Auto)  0.4  


 


Gran #  2.45  


 


Lymph # (Auto)  2.4  


 


Mono # (Auto)  0.3  


 


Eos # (Auto)  0.2  


 


Baso # (Auto)  0.02  


 


Sodium   138 


 


Potassium   4.2 


 


Chloride   100 


 


Carbon Dioxide   28 


 


Anion Gap   14 


 


BUN   25 H 


 


Creatinine   1.1 


 


Est GFR ( Amer)   > 60 


 


Est GFR (Non-Af Amer)   > 60 


 


POC Glucose (mg/dL)    192 H


 


Random Glucose   188 H 


 


Calcium   9.1 


 


Total Bilirubin   0.5 


 


AST   43 


 


ALT   72 H 


 


Alkaline Phosphatase   65 


 


Troponin I   


 


Total Protein   6.9 


 


Albumin   4.0 


 


Globulin   2.9 


 


Albumin/Globulin Ratio   1.4 


 


Triglycerides   869 H 


 


Cholesterol   210 H 


 


LDL Cholesterol Direct   50 


 


HDL Cholesterol   27 L 














  07/14/18 07/14/18





  11:30 17:16


 


WBC  


 


RBC  


 


Hgb  


 


Hct  


 


MCV  


 


MCH  


 


MCHC  


 


RDW  


 


Plt Count  


 


MPV  


 


Gran %  


 


Lymph % (Auto)  


 


Mono % (Auto)  


 


Eos % (Auto)  


 


Baso % (Auto)  


 


Gran #  


 


Lymph # (Auto)  


 


Mono # (Auto)  


 


Eos # (Auto)  


 


Baso # (Auto)  


 


Sodium  


 


Potassium  


 


Chloride  


 


Carbon Dioxide  


 


Anion Gap  


 


BUN  


 


Creatinine  


 


Est GFR ( Amer)  


 


Est GFR (Non-Af Amer)  


 


POC Glucose (mg/dL)  185 H  189 H


 


Random Glucose  


 


Calcium  


 


Total Bilirubin  


 


AST  


 


ALT  


 


Alkaline Phosphatase  


 


Troponin I  


 


Total Protein  


 


Albumin  


 


Globulin  


 


Albumin/Globulin Ratio  


 


Triglycerides  


 


Cholesterol  


 


LDL Cholesterol Direct  


 


HDL Cholesterol  














Attending/Attestation





- Attestation


I have personally seen and examined this patient.: Yes


I have fully participated in the care of the patient.: Yes


I have reviewed all pertinent clinical information: Yes


Notes (Text): 





07/14/18 18:57





Attending note;





Patient seen and examined with the resident in ER.


Patient is alert and awake.





Patient is a 52 year old male with  the PMH of  ? heart attacks s/p 1 stent, HTN

, hypercholesterolemia, DM-2, CVA at age 22, and meningioma s/p 5 brain 

surgeries who presents to the ED with chest pain and neck pain.


Patient has multiple medical issues dating from age 22.


Patient has been seeing  multiple doctors in [place] and in [place].


History per patient. No other documentation available at this time.





Patient was recently seen by PMD .





Patient has a chronic neck pain for the past few months.


Patient is currently pain-free.


Admit to telemetry.


EKG no acute changes. Cardiac enzymes negative.


Cardiology evaluation requested.


Echocardiogram ordered.


Denies any recent stress test.





CT of cervical spine ordered. Physical therapy evaluation requested.


Started on Flexeril.





Monitor closely in telemetry.





Upon discharge patient will follow-up with PMD Dr. Moscoso.

## 2018-07-13 NOTE — ED PDOC
Arrival/HPI





- General


Chief Complaint: Chest Pain


Time Seen by Provider: 07/13/18 13:41


Historian: Patient





- History of Present Illness


Narrative History of Present Illness (Text): 





07/13/18 13:44


52 year old male, whose PMH includes angina, mitral valve prolapse, x4 MI, 

cardiomyopathy, asthma, seizure, benign brain tumor and diabetes, who presents 

to the emergency department complaining of mid sternal to left sided chest pain 

associated with nausea since this morning. Patient reports driving and felt 

uncomfortable. He also complaints of neck pain that radiates to bilateral arms 

causing his right hand to go numb x 3 months. Patient notes the chest pain has 

mildly gone down to a 7 out of 10 compared to this morning but has worsening 

neck pain. Additionally, he notes being allergic to Aspirin. Patient denies 

fever, shortness of breath, abdominal pain, vomiting, diarrhea, dizziness, or 

other complaints. 





PMD: Dr. Moscoso 





Time/Duration: Prior to Arrival


Symptom Onset: Sudden


Symptom Course: Improving


Activities at Onset: Rest


Context: Home





Past Medical History





- Provider Review


Nursing Documentation Reviewed: Yes





- Past History


Past History: No Previous





- Infectious Disease


Hx of Infectious Diseases: None





- Tetanus Immunization


Tetanus Immunization: >10 years Ago





- Cardiac


Hx Cardiac Disorders: Yes


Hx Angina: Yes


Hx Cardiac Arrhythmia: Yes


Hx Mitral Valve Prolapse: Yes


Other/Comment: 4 MI, cardiomyopathy





- Pulmonary


Hx Respiratory Disorders: Yes


Hx Asthma: Yes





- Neurological


Hx Neurological Disorder: Yes


Hx Seizures: Yes


Other/Comment: Brain tumor





- HEENT


Hx HEENT Disorder: No





- Renal


Hx Renal Disorder: No





- Endocrine/Metabolic


Hx Endocrine Disorders: Yes


Hx Diabetes Mellitus Type 2: Yes


Other/Comment: BRAIN TUMOR (NON MALIGNANT)





- Hematological/Oncological


Hx Blood Disorders: No





- Integumentary


Hx Dermatological Disorder: No





- Musculoskeletal/Rheumatological


Hx Musculoskeletal Disorders: No


Hx Falls: No





- Gastrointestinal


Hx Gastrointestinal Disorders: No





- Genitourinary/Gynecological


Hx Genitourinary Disorders: No





- Psychiatric


Hx Psychophysiologic Disorder: No


Hx Substance Use: No





- Surgical History


Hx Appendectomy: Yes


Hx Orthopedic Surgery: Yes (LEFT SHOULDER REPAIR)





- Anesthesia


Hx Anesthesia: No





- Suicidal Assessment


Feels Threatened In Home Enviroment: No





Family/Social History





- Physician Review


Nursing Documentation Reviewed: Yes


Family/Social History: CAD/MI (father MI at 51 year old)


Smoking Status: Former Smoker


Hx Alcohol Use: No


Hx Substance Use: No


Hx Substance Use Treatment: No





Allergies/Home Meds


Allergies/Adverse Reactions: 


Allergies





alprazolam [From Xanax] Allergy (Verified 04/20/18 08:26)


 RASH


amoxicillin Allergy (Verified 04/20/18 08:26)


 RASH


aspirin Allergy (Verified 04/20/18 08:26)


 RASH


propranolol [From Inderal LA] Allergy (Verified 04/20/18 08:26)


 RASH


shellfish derived Allergy (Verified 04/20/18 08:26)


 RASH








Home Medications: 


 Home Meds











 Medication  Instructions  Recorded  Confirmed


 


Metoprolol Succinate XL [Toprol XL] 25 mg PO DAILY 08/08/15 04/20/18


 


Diclofenac Sodium [Voltaren] 75 mg PO DAILY 04/20/18 04/20/18


 


Lisinopril [Zestril] 2.5 mg PO DAILY 04/20/18 04/20/18


 


Omeprazole 40 mg PO DAILY 04/20/18 04/20/18














Review of Systems





- Review of Systems


Constitutional: absent: Fevers


Eyes: absent: Vision Changes


Respiratory: absent: SOB


Cardiovascular: Chest Pain


Gastrointestinal: Nausea.  absent: Abdominal Pain, Diarrhea, Vomiting


Genitourinary Male: absent: Dysuria


Musculoskeletal: Neck Pain (radiates to bilateral arms )


Neurological: absent: Headache, Dizziness


Endocrine: absent: Diaphoresis


Hemo/Lymphatic: absent: Adenopathy





Physical Exam


Vital Signs Reviewed: Yes


Vital Signs











  Temp Pulse Resp BP Pulse Ox


 


 07/13/18 13:33  98.6 F  62  16  152/90 H  99











Temperature: Afebrile


Blood Pressure: Hypertensive


Pulse: Regular


Respiratory Rate: Normal


Appearance: Positive for: Well-Appearing, Non-Toxic, Comfortable


Pain Distress: None


Mental Status: Positive for: Alert and Oriented X 3





- Systems Exam


Head: Present: Atraumatic, Normocephalic


Pupils: Present: PERRL


Extroacular Muscles: Present: EOMI


Conjunctiva: Present: Normal


Mouth: Present: Moist Mucous Membranes


Neck: Present: Paraspinal Tenderness (tenderness on left side of neck and low 

right side of neck )


Respiratory/Chest: Present: Clear to Auscultation, Good Air Exchange.  No: 

Respiratory Distress, Accessory Muscle Use, Wheezes, Decreased Breath Sounds, 

Rales, Retracting, Rhonchi


Cardiovascular: Present: Regular Rate and Rhythm, Normal S1, S2.  No: Murmurs


Abdomen: Present: Normal Bowel Sounds.  No: Tenderness, Distention, Peritoneal 

Signs, Rebound, Guarding


Upper Extremity: Present: Normal ROM, NORMAL PULSES, Tenderness (bilateral 

anterior shoulder and ulnar side of elbow tenderness ), Neurovascularly Intact, 

Capillary Refill < 2s.  No: Cyanosis, Edema, Erythema, Deformity


Neurological: Present: GCS=15, CN II-XII Intact, Speech Normal, Motor Func 

Grossly Intact, Normal Sensory Function, Normal Cerebellar Funct, Memory Normal


Skin: Present: Warm, Dry, Normal Color.  No: Rashes


Psychiatric: Present: Alert, Oriented x 3, Normal Insight, Normal Concentration





Medical Decision Making


ED Course and Treatment: 





07/13/18


Impression:


52 year old male with tenderness on left side of neck and low right side of neck

, tenderness on bilateral anterior shoulder and ulnar side of elbow complaining 

of chest pain and neck pain that radiates to arms.  








Differential Diagnosis included but are not limited to:  r/o ACS vs. Cervical 

Radioculopathy 





Plan:


-- EKG


-- Chest X-ray 


-- Labs 


-- Morphine and Nitroglycerine 


-- Reassess and disposition





Progress Notes:


 


EKG:


Ordered, reviewed, and independently interpreted the EKG.


Rate : 85 BPM


Rhythm : NSR


Interpretation : No ST-segment elevations or depressions, no T-wave inversions, 

normal intervals.





07/13/18 15:01


Chest X-ray: Edward Fobben 


Impressions: No active disease





07/13/18 15:11


Chest pain resolved after Nitro. Still has neck pain but improving. 





Case was discussed with Dr. Mckeon who agrees to place on telemetry. Dr. Kuo on call so a consult was placed for him. 





- Lab Interpretations


Lab Results: 








 07/13/18 13:47 





 07/13/18 13:47 





 Lab Results





07/13/18 13:47: Sodium 138, Potassium 4.7, Chloride 100, Carbon Dioxide 25, 

Anion Gap 18, BUN 18, Creatinine 0.9, Est GFR (African Amer) > 60, Est GFR (Non-

Af Amer) > 60, Random Glucose 279 H, Calcium 9.5, Magnesium 1.8, Total 

Bilirubin 0.7, AST 49, ALT 78 H, Alkaline Phosphatase 71, Lactate Dehydrogenase 

405, Total Creatine Kinase 188, Troponin I < 0.01, Total Protein 7.6, Albumin 

4.5, Globulin 3.1, Albumin/Globulin Ratio 1.5


07/13/18 13:47: WBC 5.4  D, RBC 5.09, Hgb 14.3, Hct 39.8 L, MCV 78.2 L, MCH 28.1

, MCHC 35.9, RDW 13.7, Plt Count 135, MPV 9.6, Gran % 62.4, Lymph % (Auto) 30.6

, Mono % (Auto) 4.2, Eos % (Auto) 2.2, Baso % (Auto) 0.6, Gran # 3.38, Lymph # (

Auto) 1.7, Mono # (Auto) 0.2, Eos # (Auto) 0.1, Baso # (Auto) 0.03








I have reviewed the lab results: Yes





- RAD Interpretation


Radiology Orders: 








07/13/18 13:42


CHEST PORTABLE [RAD] Stat 











: Radiologist





- EKG Interpretation


Interpreted by ED Physician: Yes


Type: 12 lead EKG





- Medication Orders


Current Medication Orders: 











Discontinued Medications





Morphine Sulfate (Morphine)  4 mg IVP STAT STA


   Stop: 07/13/18 13:54


   Last Admin: 07/13/18 13:58  Dose: 4 mg





MAR Pain Assessment


 Document     07/13/18 13:58  LM  (Rec: 07/13/18 13:58  [facility]  [id number])


     Pain Reassessment


      Is this a pain reassessment?               No


     Sleep


      Is patient sleeping during reassessment?   No


     Presence of Pain


      Presence of Pain                           Yes


     Pain Scale Used


      Pain Scale Used                            Numeric


     Location


      Pain Location Body Site                    Chest


     Description


      Intensity of Pain at present               10


IVP Administration


 Document     07/13/18 13:58  [facility]  (Rec: 07/13/18 13:58  [facility]  [id number])


     Charges for Administration


      # of IVP Administrations                   1





Nitroglycerin (Nitrostat Sl Tab)  0.3 mg SL STAT STA


   Stop: 07/13/18 13:54


   Last Admin: 07/13/18 13:58  Dose: 0.3 mg





Ondansetron HCl (Zofran Inj)  4 mg IVP STAT STA


   Stop: 07/13/18 14:09


   Last Admin: 07/13/18 14:12  Dose: 4 mg





IVP Administration


 Document     07/13/18 14:12  [facility]  (Rec: 07/13/18 14:12  [facility]  [id number])


     Charges for Administration


      # of IVP Administrations                   1














- Scribe Statement


The provider has reviewed the documentation as recorded by the Scribe





Bel Jensen





Provider Anayaibe Attestation:


All medical record entries made by the Scribe were at my direction and 

personally dictated by me. I have reviewed the chart and agree that the record 

accurately reflects my personal performance of the history, physical exam, 

medical decision making, and the department course for this patient. I have 

also personally directed, reviewed, and agree with the discharge instructions 

and disposition. 





Disposition/Present on Arrival





- Present on Arrival


Any Indicators Present on Arrival: No


History of DVT/PE: No


History of Uncontrolled Diabetes: No


Urinary Catheter: No


History of Decub. Ulcer: No


History Surgical Site Infection Following: None





- Disposition


Have Diagnosis and Disposition been Completed?: Yes


Diagnosis: 


 Cervical radiculopathy, Chest pain





Disposition: HOSPITALIZED


Disposition Time: 14:55


Patient Plan: Admission


Condition: FAIR


Discharge Instructions (ExitCare):  Chest Pain (ED)


Forms:  [facility] (English)

## 2018-07-13 NOTE — RAD
Date of service: 



07/13/2018



HISTORY:

chest pain  



COMPARISON:

No prior. 



FINDINGS:



LUNGS:

No active pulmonary disease.



PLEURA:

No significant pleural effusion identified, no pneumothorax apparent.



CARDIOVASCULAR:

Normal.



OSSEOUS STRUCTURES:

No significant abnormalities.



VISUALIZED UPPER ABDOMEN:

Normal.



OTHER FINDINGS:

None.



IMPRESSION:

No active disease.

## 2018-07-14 VITALS — SYSTOLIC BLOOD PRESSURE: 138 MMHG | RESPIRATION RATE: 20 BRPM | DIASTOLIC BLOOD PRESSURE: 82 MMHG | TEMPERATURE: 98 F

## 2018-07-14 VITALS — OXYGEN SATURATION: 99 %

## 2018-07-14 VITALS — HEART RATE: 90 BPM

## 2018-07-14 LAB
ALBUMIN SERPL-MCNC: 4 G/DL (ref 3–4.8)
ALBUMIN/GLOB SERPL: 1.4 {RATIO} (ref 1.1–1.8)
ALT SERPL-CCNC: 72 U/L (ref 7–56)
AST SERPL-CCNC: 43 U/L (ref 17–59)
BASOPHILS # BLD AUTO: 0.02 K/MM3 (ref 0–2)
BASOPHILS NFR BLD: 0.4 % (ref 0–3)
BUN SERPL-MCNC: 25 MG/DL (ref 7–21)
CALCIUM SERPL-MCNC: 9.1 MG/DL (ref 8.4–10.5)
EOSINOPHIL # BLD: 0.2 10*3/UL (ref 0–0.7)
EOSINOPHIL NFR BLD: 3.8 % (ref 1.5–5)
ERYTHROCYTE [DISTWIDTH] IN BLOOD BY AUTOMATED COUNT: 13.6 % (ref 11.5–14.5)
GFR NON-AFRICAN AMERICAN: > 60
GRANULOCYTES # BLD: 2.45 10*3/UL (ref 1.4–6.5)
GRANULOCYTES NFR BLD: 46 % (ref 50–68)
HDLC SERPL-MCNC: 27 MG/DL (ref 29–60)
HGB BLD-MCNC: 13.3 G/DL (ref 14–18)
LDLC SERPL-MCNC: 50 MG/DL (ref 0–129)
LYMPHOCYTES # BLD: 2.4 10*3/UL (ref 1.2–3.4)
LYMPHOCYTES NFR BLD AUTO: 44.7 % (ref 22–35)
MCH RBC QN AUTO: 27.5 PG (ref 25–35)
MCHC RBC AUTO-ENTMCNC: 34.8 G/DL (ref 31–37)
MCV RBC AUTO: 79.1 FL (ref 80–105)
MONOCYTES # BLD AUTO: 0.3 10*3/UL (ref 0.1–0.6)
MONOCYTES NFR BLD: 5.1 % (ref 1–6)
PLATELET # BLD: 128 10^3/UL (ref 120–450)
PMV BLD AUTO: 9.8 FL (ref 7–11)
RBC # BLD AUTO: 4.83 10^6/UL (ref 3.5–6.1)
WBC # BLD AUTO: 5.3 10^3/UL (ref 4.5–11)

## 2018-07-14 RX ADMIN — INSULIN HUMAN SCH UNIT: 100 INJECTION, SOLUTION PARENTERAL at 07:58

## 2018-07-14 RX ADMIN — INSULIN HUMAN SCH UNIT: 100 INJECTION, SOLUTION PARENTERAL at 17:48

## 2018-07-14 RX ADMIN — INSULIN HUMAN SCH UNIT: 100 INJECTION, SOLUTION PARENTERAL at 11:45

## 2018-07-14 NOTE — CON
DATE:  07/14/2018



REQUESTING PHYSICIAN:  Dr. Mckeon.



REASON FOR CONSULTATION:  Chest pain.



HISTORY:  This is a 52-year-old man with somewhat complex past medical

history who has had variable followups in the past.  He presented with

retrosternal chest discomfort, which he describes as a sharp disk pain and

pressure-like sensation as well.  He became concerned and presented to the

Emergency Room.  He states his pain was 5/10 yesterday; however, feels

somewhat better.  However, his pain does persist.  His cardiac enzymes have

been negative.  Electrocardiogram shows no acute changes.  He spends most

of his time in [place], but returned from there several months ago.  He

does not follow up regularly with the cardiologist.  Reportedly, he has had

prior myocardial infarction and coronary artery stent placed in the past. 

He also underwent a carotid stent based on history.  He reportedly has had

paroxysmal atrial fibrillation as well as a history of hypertension and

hyperlipidemia.  His past history is known for the problems mentioned

above.  He has undergone prior brain surgery in the past and apparently,

had a meningioma as well as a history of hydrocephalus and placement of a

 shunt.  He underwent prior left shoulder surgery as well as appendectomy. 

He does have a history of hypertension and diabetes as well.  His current

medications reportedly include Depakote 500 mg daily, Flexeril, insulin

coverage, Lovenox, Protonix 40 mg daily, Toprol-XL 25 mg daily and Zestril

2.5 mg daily.



ALLERGIES:  HE REPORTEDLY HAD REACTIONS TO PENICILLIN, PROPRANOLOL, ASPIRIN

AND ALPRAZOLAM IN THE PAST.



SOCIAL HISTORY:  He is a former smoker.  He is reportedly a retired school

teacher.  He has a history of alcohol abuse in the past.



FAMILY HISTORY:  Father had multiple myocardial infarctions and ultimately

underwent heart transplant.  He passed away of prostate cancer.  Mother had

myocardial infarctions in her 50s and 60s.



REVIEW OF SYSTEMS:  The 10-point review of systems is notable mainly for

problems as listed above.



PHYSICAL EXAMINATION:

GENERAL:  He is an overweight middle-aged man.

VITAL SIGNS:  Blood pressure is 140/80 with a pulse of 70 in sinus,

respirations are 16.  He is afebrile.

HEENT:  Normocephalic, atraumatic.

NECK:  Supple.  No JVD is noted.

CHEST:  A few scattered rhonchi heard.

HEART:  PMI, normal position.  No pathological gallops noted.

ABDOMEN:  Soft and nontender.  Normoactive bowel sounds.

EXTREMITIES:  No clubbing, cyanosis, edema.

SKIN:  Warm and dry.

PSYCHIATRIC:  Normal mood and affect.

NEUROLOGIC:  Alert and oriented x3.  No gross motor or sensory deficits

appreciable.



DIAGNOSTIC DATA:  Three sets of cardiac enzymes are negative.  White count

is 5.4, hemoglobin and hematocrit of 14.3 and 39.8 with platelet count

135,000, potassium 4.7, BUN and creatinine 18 and 0.9, glucose 279.  Chest

x-ray reveals normal cardiac silhouette with clear lung fields. 

Electrocardiogram reveals sinus rhythm with no acute ST-T changes.



IMPRESSION:

1.  Chest pain, unclear if this is cardiac in nature or due to neuropathic

or musculoskeletal cause.

2.  Apparent coronary artery disease, status post prior percutaneous

coronary intervention, details unclear.

3.  History of hypertension, diabetes and remote tobacco abuse.

4.  Rest of problems as noted.



RECOMMENDATIONS:  The patient should be ambulated.  He does need a followup

stress test.  If he has no worsening of his pain, discharge home with

outpatient stress test next week would be reasonable.  If he has recurrent

pain, he may be kept in the hospital and have stress test performed on

Monday.  Attempts will be made to obtain more information regarding his

prior workup.



Thank you for this consultation.







__________________________________________

Mahesh Trejo MD



DD:  07/14/2018 6:19:45

DT:  07/14/2018 6:24:23

Job # [id number]



[id number]

## 2018-07-14 NOTE — CARD
--------------- APPROVED REPORT --------------





Date of service: 07/13/2018



EKG Measurement

Heart [id number]

UT [id number]

[id number]

[id number]

[id number]



<Conclusion>

Normal sinus rhythm

Normal ECG

## 2018-07-14 NOTE — CARD
--------------- APPROVED REPORT --------------





Date of service: 07/14/2018



EKG Measurement

Heart [id number]

AL [id number]

[id number]

[id number]

[id number]



<Conclusion>

Normal sinus rhythm

Normal ECG

## 2018-07-14 NOTE — CP.PCM.DIS
<Irving Ralph - Last Filed: 07/14/18 21:47>





Provider





- Provider


Date of Admission: 


07/13/18 14:55





Attending physician: 


Olive Mckeon MD





Time Spent in preparation of Discharge (in minutes): 45





Diagnosis





- Discharge Diagnosis


(1) Chest pain


Status: Chronic   Priority: Medium   





(2) Cervical radiculopathy


Status: Chronic   Priority: Medium   





(3) Diabetes mellitus


Status: Chronic   Priority: Medium   





(4) Hyperlipidemia


Status: Chronic   Priority: Medium   





(5) Hypertension


Status: Chronic   Priority: Medium   





(6) Meningioma


Status: Chronic   Priority: Medium   





Hospital Course





- Lab Results


Lab Results: 


 Most Recent Lab Values











WBC  5.3 10^3/ul (4.5-11.0)   07/14/18  06:20    


 


RBC  4.83 10^6/uL (3.5-6.1)   07/14/18  06:20    


 


Hgb  13.3 g/dL (14.0-18.0)  L  07/14/18  06:20    


 


Hct  38.2 % (42.0-52.0)  L  07/14/18  06:20    


 


MCV  79.1 fl (80.0-105.0)  L  07/14/18  06:20    


 


MCH  27.5 pg (25.0-35.0)   07/14/18  06:20    


 


MCHC  34.8 g/dl (31.0-37.0)   07/14/18  06:20    


 


RDW  13.6 % (11.5-14.5)   07/14/18  06:20    


 


Plt Count  128 10^3/uL (120.0-450.0)   07/14/18  06:20    


 


MPV  9.8 fl (7.0-11.0)   07/14/18  06:20    


 


Gran %  46.0 % (50.0-68.0)  L  07/14/18  06:20    


 


Lymph % (Auto)  44.7 % (22.0-35.0)  H  07/14/18  06:20    


 


Mono % (Auto)  5.1 % (1.0-6.0)   07/14/18  06:20    


 


Eos % (Auto)  3.8 % (1.5-5.0)   07/14/18  06:20    


 


Baso % (Auto)  0.4 % (0.0-3.0)   07/14/18  06:20    


 


Gran #  2.45  (1.4-6.5)   07/14/18  06:20    


 


Lymph # (Auto)  2.4  (1.2-3.4)   07/14/18  06:20    


 


Mono # (Auto)  0.3  (0.1-0.6)   07/14/18  06:20    


 


Eos # (Auto)  0.2  (0.0-0.7)   07/14/18  06:20    


 


Baso # (Auto)  0.02 K/mm3 (0.0-2.0)   07/14/18  06:20    


 


Sodium  138 mmol/L (132-148)   07/14/18  06:20    


 


Potassium  4.2 mmol/L (3.6-5.0)   07/14/18  06:20    


 


Chloride  100 mmol/L ()   07/14/18  06:20    


 


Carbon Dioxide  28 mmol/L (21-33)   07/14/18  06:20    


 


Anion Gap  14  (10-20)   07/14/18  06:20    


 


BUN  25 mg/dL (7-21)  H  07/14/18  06:20    


 


Creatinine  1.1 mg/dl (0.8-1.5)   07/14/18  06:20    


 


Est GFR ( Amer)  > 60   07/14/18  06:20    


 


Est GFR (Non-Af Amer)  > 60   07/14/18  06:20    


 


POC Glucose (mg/dL)  189 mg/dL ()  H  07/14/18  17:16    


 


Random Glucose  188 mg/dL ()  H  07/14/18  06:20    


 


Calcium  9.1 mg/dL (8.4-10.5)   07/14/18  06:20    


 


Magnesium  1.8 mg/dL (1.7-2.2)   07/13/18  13:47    


 


Total Bilirubin  0.5 mg/dL (0.2-1.3)   07/14/18  06:20    


 


AST  43 U/L (17-59)   07/14/18  06:20    


 


ALT  72 U/L (7-56)  H  07/14/18  06:20    


 


Alkaline Phosphatase  65 U/L ()   07/14/18  06:20    


 


Lactate Dehydrogenase  405 U/L (333-699)   07/13/18  13:47    


 


Total Creatine Kinase  188 U/L ()   07/13/18  13:47    


 


Troponin I  < 0.01 ng/mL  07/14/18  01:15    


 


Total Protein  6.9 g/dL (5.8-8.3)   07/14/18  06:20    


 


Albumin  4.0 g/dL (3.0-4.8)   07/14/18  06:20    


 


Globulin  2.9 gm/dL  07/14/18  06:20    


 


Albumin/Globulin Ratio  1.4  (1.1-1.8)   07/14/18  06:20    


 


Triglycerides  869 mg/dL ()  H  07/14/18  06:20    


 


Cholesterol  210 mg/dL (130-200)  H  07/14/18  06:20    


 


LDL Cholesterol Direct  50 mg/dL (0-129)   07/14/18  06:20    


 


HDL Cholesterol  27 mg/dL (29-60)  L  07/14/18  06:20    


 


TSH 3rd Generation  1.39 mIU/mL (0.46-4.68)   07/13/18  13:47    














- Hospital Course


Hospital Course: 





Mr. Downing is a 52 year old male with PMH of multiple myocardial infarctions s/p 

1 stent, HTN, DLD, DM-2, CVA, paroxysmal atrial fibrillation, and meningioma s/

p 5 brain surgeries who presents to the ED with chest pain and neck pain. 

Patient was treated with metoprolol, lisinopril, insulin, depakote, flexeril, 

lovenox, and protonix. During the course of his hospital stay, he underwent EKG

, Cervical spine CT, chest xray, and echocardiogram. EKG showed sinus rhythm at 

a rate of 75 with no ST elevations or depressions. Chest xray showed no active 

disease. Echocardiogram and cervical spine CT results are still pending. The 

patient agreed to be discharge without the results and have the hospital call 

him with the results once it is read and available. Cardiology (Dr. Trejo) 

was consulted in the management and treatment of the patient. Cardiology 

evaluated the patient and recommended that patient do a stress test as an 

outpatient. Patient was instructed to resume activity as tolerated and to 

continue a heart healthy diet at home. Patient instructed to take new 

medications flexeril, tricor and continue with home medications: voltaren, 

depakote, glucotrol, zestril, and omeprazole. He was educated on the correct 

way to take medications and was told to follow up with his primary care doctor 

within 1 week of discharge. Patient further informed to return to the ED for 

worsening of symptoms.Patient is now medically optimized for discharge.





Discharge Exam





- Head Exam


Head Exam: ATRAUMATIC, NORMAL INSPECTION, NORMOCEPHALIC





- Eye Exam


Eye Exam: Normal appearance





- ENT Exam


ENT Exam: Mucous Membranes Moist





- Respiratory Exam


Respiratory Exam: Clear to PA & Lateral.  absent: Rales, Rhonchi, Wheezes





- Cardiovascular Exam


Cardiovascular Exam: REGULAR RHYTHM, +S1, +S2.  absent: Gallop, Rubs, Systolic 

Murmur





- GI/Abdominal Exam


GI & Abdominal Exam: Normal Bowel Sounds, Soft.  absent: Tenderness





- Neurological Exam


Neurological exam: Alert, Oriented x3





- Psychiatric Exam


Psychiatric exam: Normal Affect, Normal Mood





- Skin


Skin Exam: Dry, Normal Color, Warm





Discharge Plan





- Discharge Medications


Prescriptions: 


Cyclobenzaprine [Flexeril] 5 mg PO TID PRN #6 tab


 PRN Reason: muscle spasms


Fenofibrate [Tricor] 145 mg PO DAILY #14 tab





- Follow Up Plan


Condition: FAIR


Disposition: HOME/ ROUTINE


Instructions:  Chest Pain (ED), Chest Pain (DC), Chest Pain (GEN), Hypertension 

(DC), Hypertension (GEN)


Additional Instructions: 


1. Please start new medications:


Flexeril 5mg by mouth 3 times daily as needed


Tricor 145mg by mouth once daily


2. Please continue home medications


3. Please follow up with Primary care doctor within 1 week


4. Resume activity as tolerated


5. Continue heart healthy diet at home


6. Return to the ED if symptoms returns





<Olive Mckeon - Last Filed: 07/15/18 07:24>





Provider





- Provider


Date of Admission: 


07/13/18 14:55





Attending physician: 


Olive Mckeon MD








Hospital Course





- Lab Results


Lab Results: 


 Most Recent Lab Values











WBC  5.3 10^3/ul (4.5-11.0)   07/14/18  06:20    


 


RBC  4.83 10^6/uL (3.5-6.1)   07/14/18  06:20    


 


Hgb  13.3 g/dL (14.0-18.0)  L  07/14/18  06:20    


 


Hct  38.2 % (42.0-52.0)  L  07/14/18  06:20    


 


MCV  79.1 fl (80.0-105.0)  L  07/14/18  06:20    


 


MCH  27.5 pg (25.0-35.0)   07/14/18  06:20    


 


MCHC  34.8 g/dl (31.0-37.0)   07/14/18  06:20    


 


RDW  13.6 % (11.5-14.5)   07/14/18  06:20    


 


Plt Count  128 10^3/uL (120.0-450.0)   07/14/18  06:20    


 


MPV  9.8 fl (7.0-11.0)   07/14/18  06:20    


 


Gran %  46.0 % (50.0-68.0)  L  07/14/18  06:20    


 


Lymph % (Auto)  44.7 % (22.0-35.0)  H  07/14/18  06:20    


 


Mono % (Auto)  5.1 % (1.0-6.0)   07/14/18  06:20    


 


Eos % (Auto)  3.8 % (1.5-5.0)   07/14/18  06:20    


 


Baso % (Auto)  0.4 % (0.0-3.0)   07/14/18  06:20    


 


Gran #  2.45  (1.4-6.5)   07/14/18  06:20    


 


Lymph # (Auto)  2.4  (1.2-3.4)   07/14/18  06:20    


 


Mono # (Auto)  0.3  (0.1-0.6)   07/14/18  06:20    


 


Eos # (Auto)  0.2  (0.0-0.7)   07/14/18  06:20    


 


Baso # (Auto)  0.02 K/mm3 (0.0-2.0)   07/14/18  06:20    


 


Sodium  138 mmol/L (132-148)   07/14/18  06:20    


 


Potassium  4.2 mmol/L (3.6-5.0)   07/14/18  06:20    


 


Chloride  100 mmol/L ()   07/14/18  06:20    


 


Carbon Dioxide  28 mmol/L (21-33)   07/14/18  06:20    


 


Anion Gap  14  (10-20)   07/14/18  06:20    


 


BUN  25 mg/dL (7-21)  H  07/14/18  06:20    


 


Creatinine  1.1 mg/dl (0.8-1.5)   07/14/18  06:20    


 


Est GFR ( Amer)  > 60   07/14/18  06:20    


 


Est GFR (Non-Af Amer)  > 60   07/14/18  06:20    


 


POC Glucose (mg/dL)  189 mg/dL ()  H  07/14/18  17:16    


 


Random Glucose  188 mg/dL ()  H  07/14/18  06:20    


 


Calcium  9.1 mg/dL (8.4-10.5)   07/14/18  06:20    


 


Magnesium  1.8 mg/dL (1.7-2.2)   07/13/18  13:47    


 


Total Bilirubin  0.5 mg/dL (0.2-1.3)   07/14/18  06:20    


 


AST  43 U/L (17-59)   07/14/18  06:20    


 


ALT  72 U/L (7-56)  H  07/14/18  06:20    


 


Alkaline Phosphatase  65 U/L ()   07/14/18  06:20    


 


Lactate Dehydrogenase  405 U/L (333-699)   07/13/18  13:47    


 


Total Creatine Kinase  188 U/L ()   07/13/18  13:47    


 


Troponin I  < 0.01 ng/mL  07/14/18  01:15    


 


Total Protein  6.9 g/dL (5.8-8.3)   07/14/18  06:20    


 


Albumin  4.0 g/dL (3.0-4.8)   07/14/18  06:20    


 


Globulin  2.9 gm/dL  07/14/18  06:20    


 


Albumin/Globulin Ratio  1.4  (1.1-1.8)   07/14/18  06:20    


 


Triglycerides  869 mg/dL ()  H  07/14/18  06:20    


 


Cholesterol  210 mg/dL (130-200)  H  07/14/18  06:20    


 


LDL Cholesterol Direct  50 mg/dL (0-129)   07/14/18  06:20    


 


HDL Cholesterol  27 mg/dL (29-60)  L  07/14/18  06:20    


 


TSH 3rd Generation  1.39 mIU/mL (0.46-4.68)   07/13/18  13:47    














Attending/Attestation





- Attestation


I have personally seen and examined this patient.: Yes


I have fully participated in the care of the patient.: Yes


I have reviewed all pertinent clinical information, including history, physical 

exam and plan: Yes


Notes (Text): 





07/15/18 07:22








Attending note;





Patient seen and examined with the resident. 


neck and muscle spasm is improved. not in distress.


Patient is alert and awake.





Patient is a 52 year old male with  the PMH of MI s/p 1 stent, HTN, 

hypercholesterolemia, DM-2, CVA at age 22, and meningioma s/p 5 brain surgeries 

who presents to the ED with chest pain and neck pain.


 cardiac enzymes negative. seen by cardiology Dr. walker.


recommended outpatient stress test. 


Patient was recently seen by PMD . he would like to follow up with him 

for stress test.





Patient has a chronic neck pain for the past few months.





CT of cervical spine  done. results pending. Physical therapy evaluation 

appreciated.


Started on Flexeril.





discharge home. Follow up ECHO and CT neck results from medical records.





Upon discharge patient will follow-up with PMD Dr. Moscoso.

## 2018-07-15 NOTE — CT
Date of service: 



07/14/2018



PROCEDURE:  CT Cervical Spine without contrast



HISTORY:

Cervical Radiculopathy



COMPARISON:

None available.



TECHNIQUE:

Axial computed tomography images were obtained of the cervical spine 

without the use of intravenous contrast. Coronal and sagittal 

reformatted images were created and reviewed.



Radiation dose: 



Total exam DLP = 498 mGy-cm.



This CT exam was performed using one or more of the following dose 

reduction techniques: Automated exposure control, adjustment of the 

mA and/or kV according to patient size, and/or use of iterative 

reconstruction technique.



FINDINGS:



VERTEBRAE:

No fracture. Normal alignment. No destructive bony lesion.



DISCS/SPINAL CANAL/NEURAL FORAMINA:

No significant central canal or neural foraminal stenosis. There is a 

disc bulge at C5-6 with bilateral foraminal narrowing. There is a 

mild disc bulge at C4-5 and C3-4



PARASPINAL SOFT TISSUES:

Unremarkable. 



OTHER FINDINGS:

The report concurs with the preliminary [facility] report



IMPRESSION:

There is a disc bulge at C5-6 with bilateral foraminal narrowing. 

There is a mild disc bulge at C4-5 and C3-4

## 2018-08-24 ENCOUNTER — HOSPITAL ENCOUNTER (EMERGENCY)
Dept: HOSPITAL 42 - ED | Age: 53
LOS: 1 days | Discharge: HOME | End: 2018-08-25
Payer: MEDICARE

## 2018-08-24 VITALS — BODY MASS INDEX: 26.4 KG/M2

## 2018-08-24 VITALS — RESPIRATION RATE: 18 BRPM | OXYGEN SATURATION: 97 %

## 2018-08-24 DIAGNOSIS — E11.9: ICD-10-CM

## 2018-08-24 DIAGNOSIS — I10: ICD-10-CM

## 2018-08-24 DIAGNOSIS — M70.22: Primary | ICD-10-CM

## 2018-08-25 VITALS — SYSTOLIC BLOOD PRESSURE: 150 MMHG | DIASTOLIC BLOOD PRESSURE: 69 MMHG | TEMPERATURE: 98 F | HEART RATE: 92 BPM

## 2018-08-25 NOTE — RAD
Date of service: 



08/25/2018



PROCEDURE:  Radiographs of the left elbow.



HISTORY:

Left elbow pain  



COMPARISON:

No prior.



FINDINGS:



BONES:

Bone alignment and mineralization are normal.  There is no acute 

displaced fracture or bone destruction.



JOINTS:

Normal. 



SOFT TISSUES:

Normal.



JOINT EFFUSION:

None.



OTHER FINDINGS:

None



IMPRESSION:

No acute fracture or dislocation.

## 2018-08-25 NOTE — ED PDOC
Arrival/HPI





- General


Chief Complaint: Upper Extremity Problem/Injury


Time Seen by Provider: 08/24/18 23:54





- History of Present Illness


Narrative History of Present Illness (Text): 





08/25/18 00:08


53 yr old male w/ hx of brain CA, cervical radiculopathy, appendectomy p/w L 

elbow pain, started yesterday. Throbbing left elbow pain, associated with pain 

on palpation and swelling to L end of elbow. First time occurence. No overlying 

redness or warmth or spreading of redness. No recent trauma or falls. No fever, 

chills or night sweat. He otherwise notes good ability to move the elbow with 

much pain. No streaking from the site. 





No other complaints. 








Past Medical History





- Provider Review


Nursing Documentation Reviewed: Yes





- Travel History


Have you recently traveled outside [place] w/in the past 3 mons?: No





- Past History


Past History: No Previous





- Infectious Disease


Hx of Infectious Diseases: None





- Tetanus Immunization


Tetanus Immunization: >10 years Ago





- Cardiac


Hx Cardiac Disorders: Yes


Hx Hypertension: Yes





- Pulmonary


Hx Respiratory Disorders: Yes


Hx Asthma: Yes





- Neurological


Hx Neurological Disorder: Yes (left arm numbness x 3 months)


Hx Seizures: Yes (prior to brain shunts)


Other/Comment: pt had "fluid on the brain had 4 brain shunts which needed rep;

lacement lat one 3/2007, benign brain tumor





- HEENT


Hx HEENT Disorder: No





- Renal


Hx Renal Disorder: No





- Endocrine/Metabolic


Hx Diabetes Mellitus Type 2: Yes





- Hematological/Oncological


Hx Blood Disorders: No





- Integumentary


Hx Dermatological Disorder: Yes (tatoos b/l arms)





- Musculoskeletal/Rheumatological


Hx Musculoskeletal Disorders: No


Hx Falls: Yes (in thr past L shoulder injury)





- Gastrointestinal


Hx Gastrointestinal Disorders: No





- Genitourinary/Gynecological


Hx Genitourinary Disorders: No





- Psychiatric


Hx Psychophysiologic Disorder: No


Hx Substance Use: No





- Surgical History


Hx Appendectomy: Yes


Hx Coronary Stent: Yes


Hx Orthopedic Surgery: Yes


Other/Comment: left shoulder repair for torn muscle, torn tendon, rotator cuff 

sx from a fall





- Anesthesia


Hx Anesthesia: No





- Suicidal Assessment


Feels Threatened In Home Enviroment: No





Family/Social History


Family/Social History: Unknown Family HX


Smoking Status: Never Smoked


Hx Alcohol Use: No


Hx Substance Use: No


Hx Substance Use Treatment: No





Allergies/Home Meds


Allergies/Adverse Reactions: 


Allergies





alprazolam [From Xanax] Allergy (Verified 08/25/18 00:12)


 RASH


amoxicillin Allergy (Verified 08/25/18 00:12)


 RASH


aspirin Allergy (Verified 08/25/18 00:12)


 RASH


propranolol [From Inderal LA] Allergy (Verified 08/25/18 00:12)


 RASH


shellfish derived Allergy (Verified 08/25/18 00:12)


 RASH








Home Medications: 


 Home Meds











 Medication  Instructions  Recorded  Confirmed


 


Metoprolol Succinate XL [Toprol XL] 25 mg PO DAILY 08/08/15 08/25/18


 


Diclofenac Sodium [Voltaren] 75 mg PO DAILY 04/20/18 08/25/18


 


Lisinopril [Zestril] 2.5 mg PO DAILY 04/20/18 08/25/18


 


Omeprazole 40 mg PO DAILY 04/20/18 08/25/18














Review of Systems





- Review of Systems


Constitutional: Normal


Eyes: Normal


ENT: Normal


Respiratory: Normal


Cardiovascular: Normal


Gastrointestinal: Normal


Genitourinary Male: Normal


Musculoskeletal: Other (L elbow pain)


Skin: Normal


Neurological: Normal


Endocrine: Normal


Hemo/Lymphatic: Normal


Psychiatric: Normal





Physical Exam


Vital Signs











  Temp Pulse Resp BP Pulse Ox


 


 08/25/18 01:00  98.0 F  92 H  18  150/69  97


 


 08/24/18 23:31  98 F  95 H  18  148/67  97














- Systems Exam


Head: Present: Atraumatic, Normocephalic


Pupils: Present: PERRL


Extroacular Muscles: Present: EOMI


Conjunctiva: Present: Normal


Mouth: Present: Moist Mucous Membranes


Neck: Present: Normal Range of Motion


Respiratory/Chest: Present: Clear to Auscultation, Good Air Exchange.  No: 

Respiratory Distress, Accessory Muscle Use


Cardiovascular: Present: Regular Rate and Rhythm, Normal S1, S2.  No: Murmurs


Abdomen: No: Tenderness, Distention, Peritoneal Signs


Back: Present: Normal Inspection


Upper Extremity: Present: Normal ROM, NORMAL PULSES, Tenderness (tip of left 

elbow w/ bursae swelling. No crepitus or overlying erythema. mild edema. N/V 

intact distally. ), Neurovascularly Intact.  No: Cyanosis, Edema, Erythema


Lower Extremity: Present: Normal Inspection.  No: Edema


Neurological: Present: GCS=15, CN II-XII Intact, Speech Normal


Skin: Present: Warm, Dry, Normal Color.  No: Rashes


Psychiatric: Present: Alert, Oriented x 3, Normal Insight, Normal Concentration





Medical Decision Making


ED Course and Treatment: 





08/25/18 00:11


53 yr old male p/w L bursae swelling. No erythema or warmth in area. No 

crepitus. Likely bursitis. Will XR and seek rx. unlikely septic joint given no 

overlying erythema, pain w/ movement of joint. 





08/25/18 01:00


Re-examined. no erythema. No warmth, non cellulitic on apperance. Xray 

unremarkable. PT notes he has motrin at home. Clear for d/c home. 











- RAD Interpretation


Radiology Orders: 








08/25/18 00:03


ELBOW LEFT 3 VIEWS ROUTINE [RAD] Stat 














- Medication Orders


Current Medication Orders: 











Discontinued Medications





Ibuprofen (Motrin Tab)  600 mg PO STAT STA


   Stop: 08/25/18 00:06


   Last Admin: 08/25/18 00:13  Dose: 600 mg





MAR Pain/Vitals


 Document     08/25/18 00:13  CASSANDRA  (Rec: 08/25/18 00:15    [id number])


     Pain Reassessment


      Is This A Pain ReAssessment?               Yes


     Presence of Pain


      Presence of Pain                           Yes


     Location


      Left, Right or Bilateral                   Left


      Pain Location Body Site                    Elbow


      Description                                Intermittent


                                                 Stabbing














Disposition/Present on Arrival





- Present on Arrival


Any Indicators Present on Arrival: No


History of DVT/PE: No


History of Uncontrolled Diabetes: Yes


Urinary Catheter: No


History of Decub. Ulcer: No


History Surgical Site Infection Following: None





- Disposition


Have Diagnosis and Disposition been Completed?: Yes


Diagnosis: 


 Olecranon bursitis





Disposition: HOME/ ROUTINE


Disposition Time: 01:01


Condition: GOOD


Discharge Instructions (ExitCare):  Bursitis, Olecranon Bursitis, Olecranon 

Bursitis (DC), Olecranon Bursitis Exercises


Additional Instructions: 





DORIS NORTON JR, thank you for letting us take care of you today. Your provider 

was Siddhartha Colindres and you were treated for BUMP ON LF ELBOW. The emergency medical 

care you received today was directed at your acute symptoms. If you were 

prescribed any medication, please fill it and take as directed. It may take 

several days for your symptoms to resolve. Return to the Emergency Department 

if your symptoms worsen, do not improve, or if you have any other problems.





Please contact your doctor or call one of the physicians/clinics you have been 

referred to that are listed on the Patient Visit Information form that is 

included in your discharge packet. Bring any paperwork you were given at 

discharge with you along with any medications you are taking to your follow up 

visit. Our treatment cannot replace ongoing medical care by a primary care 

provider outside of the emergency department.





Thank you for allowing the [facility] team to be part of your care today.








If you had an X-Ray or CT scan: A Radiologist will review the ED reading if any 

change in treatment is needed we will contact you.***





If you had a blood, urine, or wound culture: It will take several days for the 

results, if any change in treatment is needed we will contact you.***





If you had an STI test: It will take 48 hours for the results. Please call 

after 1 week if you have not heard back.***


Referrals: 


Eduarda Jacobo MD [Staff Provider] - Follow up with primary


Forms:  nWay (English)